# Patient Record
Sex: MALE | Race: WHITE
[De-identification: names, ages, dates, MRNs, and addresses within clinical notes are randomized per-mention and may not be internally consistent; named-entity substitution may affect disease eponyms.]

---

## 2019-06-22 ENCOUNTER — HOSPITAL ENCOUNTER (EMERGENCY)
Dept: HOSPITAL 91 - FTE | Age: 44
Discharge: HOME | End: 2019-06-22
Payer: COMMERCIAL

## 2019-06-22 ENCOUNTER — HOSPITAL ENCOUNTER (EMERGENCY)
Dept: HOSPITAL 10 - FTE | Age: 44
Discharge: HOME | End: 2019-06-22
Payer: COMMERCIAL

## 2019-06-22 VITALS
BODY MASS INDEX: 23.8 KG/M2 | BODY MASS INDEX: 23.8 KG/M2 | HEIGHT: 69 IN | HEIGHT: 69 IN | WEIGHT: 160.72 LBS | WEIGHT: 160.72 LBS

## 2019-06-22 VITALS — RESPIRATION RATE: 16 BRPM | DIASTOLIC BLOOD PRESSURE: 76 MMHG | SYSTOLIC BLOOD PRESSURE: 127 MMHG | HEART RATE: 67 BPM
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
## 2019-06-22 DIAGNOSIS — S91.204A: ICD-10-CM

## 2019-06-22 DIAGNOSIS — S99.921A: Primary | ICD-10-CM
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
## 2019-06-22 PROCEDURE — 99282 EMERGENCY DEPT VISIT SF MDM: CPT

## 2019-06-22 NOTE — ERD
ER Documentation


Chief Complaint


Chief Complaint





RT PINKY TOE INJURY





HPI


43-year-old male presenting with injury of his right pinky toenail 2 days ago 


while at a client's house.  He was wearing socks when he stubbed his toe on 


something.  He states that it was bleeding.  The bleeding has resolved.  However


he is having significant pain in his toenail area.  He actually stopped his toe 


again last night at his house.  He is able to walk on the foot.  He denies any 


other injury or pain.  The pain is throbbing, 7 out of 10, nonradiating.





ROS


All systems reviewed and are negative except as per history of present illness.





PMhx/Soc


Medical and Surgical Hx:  pt denies Medical Hx, pt denies Surgical Hx


Hx Psychiatric Problems:  No


Hx Miscellaneous Medical Probl:  No


Hx Alcohol Use:  No


Hx Substance Use:  No


Hx Tobacco Use:  No


Smoking Status:  Never smoker





FmHx


Family History:  No diabetes





Physical Exam


Vitals





Vital Signs


  Date      Temp  Pulse  Resp  B/P (MAP)   Pulse Ox  O2          O2 Flow    FiO2


Time                                                 Delivery    Rate


   6/22/19  97.0     80    18      127/70        99


     11:03                           (89)





Physical Exam


Const:   No acute distress


Skin:   No petechiae or rashes, no lacerations, no ecchymoses


Ext:    No cyanosis, or edema.  Right lower extremity exam: Nontender foot.  


Toes nontender.  Pinky toenail partially lifted off of the nailbed.  No active 


bleeding.  No toenail injury otherwise.  No injury to the matrix.  Full range of


motion of all toes.


Neur:   Awake and alert, sensations intact in all toes and entire right foot.





Procedures/MDM


Patient is presenting with partial avulsion of his right pinky toenail.  I do 


not suspect any acute fracture dislocation of the phalanx.  No evidence of 


foreign body.  I recommended taping the toenail down.  I discussed with the 


patient that his toenail will likely fall off in due time.  Care of the toe was 


discussed.  Return precautions given.  Worker's Comp. paperwork filled out and 


presented to the patient.  He has no limitations at this time for work.  Proper 


foot wear discussed.





Patient's blood pressure was elevated (>120/80) but appears stable without 


evidence of hypertension emergency or urgency.  The patient was counseled about 


the risks of hypertension and urged to pursue outpatient monitoring and therapy 


within a week with their primary care physician.





Departure


Diagnosis:  


   Primary Impression:  


   Injury of toenail of right foot


   Encounter type:  initial encounter  Qualified Codes:  S99.921A - Unspecified 


   injury of right foot, initial encounter


   Additional Impression:  


   Nail avulsion, toe


   Encounter type:  initial encounter  Qualified Codes:  S91.209A - Unspecified 


   open wound of unspecified toe(s) with damage to nail, initial encounter


Condition:  Stable


Patient Instructions:  Nail Avulsion, BELINDA Mccabe MD         Jun 22, 2019 12:23